# Patient Record
Sex: MALE | Race: WHITE | NOT HISPANIC OR LATINO | Employment: OTHER | URBAN - METROPOLITAN AREA
[De-identification: names, ages, dates, MRNs, and addresses within clinical notes are randomized per-mention and may not be internally consistent; named-entity substitution may affect disease eponyms.]

---

## 2023-04-05 ENCOUNTER — TELEPHONE (OUTPATIENT)
Dept: GASTROENTEROLOGY | Facility: CLINIC | Age: 83
End: 2023-04-05

## 2023-04-05 NOTE — TELEPHONE ENCOUNTER
----- Message from Antonette Jack sent at 4/5/2023 10:05 AM CDT -----  Regarding: Colonoscopy  Contact: patient  Per phone call with patient, he wanted to know the last time a colonoscopy was done.  Please return call at  [phone].    Thanks,  SJ

## 2024-02-19 ENCOUNTER — TELEPHONE (OUTPATIENT)
Dept: GASTROENTEROLOGY | Facility: CLINIC | Age: 84
End: 2024-02-19

## 2024-02-19 NOTE — TELEPHONE ENCOUNTER
Called patient and cannot lvm. Patient is not due for colonoscopy until 11/9/2024. LRA 2/19/24      ----- Message from Ignacia Lezama sent at 2/19/2024  8:43 AM CST -----  Contact: self  Pt needs to schedule colonoscopy pls call [phone] with appt details

## 2024-02-26 ENCOUNTER — TELEPHONE (OUTPATIENT)
Dept: GASTROENTEROLOGY | Facility: CLINIC | Age: 84
End: 2024-02-26
Payer: MEDICARE

## 2024-02-26 NOTE — TELEPHONE ENCOUNTER
----- Message from Ignacia Lezama sent at 2/19/2024  8:43 AM CST -----  Contact: self  Pt needs to schedule colonoscopy pls call [phone] with appt details

## 2024-02-26 NOTE — TELEPHONE ENCOUNTER
Est Rmm patient calling to [facility] colonoscopy. Patients last colonoscopy is in gmed. Patients last one was on 11/9/2021 with RMM. Patient is not due to 11/9/2024. Patient also has medicare. Patient has apt [facility] for 5/13/24 @ 9:00 with [facility] and patient is aware of date and time. 2/26/24 LRA

## 2024-03-08 ENCOUNTER — TELEPHONE (OUTPATIENT)
Dept: GASTROENTEROLOGY | Facility: CLINIC | Age: 84
End: 2024-03-08
Payer: MEDICARE

## 2024-03-08 NOTE — TELEPHONE ENCOUNTER
----- Message from Ingacia Lezama sent at 3/8/2024  8:56 AM CST -----  Contact: self  Type:  Needs Medical Advice    Who Called: Augustina Bazzi  Symptoms (please be specific): diarrhea   How long has patient had these symptoms:  1 month  Pharmacy name and phone #:  [facility]  [address], [place] [address]  [phone]  Would the patient rather a call back or a response via [facility]?   Best Call Back Number: [phone]  Additional Information: previous treatment from Dr. Rueda

## 2024-03-08 NOTE — TELEPHONE ENCOUNTER
----- Message from Ignacia Lezama sent at 3/8/2024  8:56 AM CST -----  Contact: self  Type:  Needs Medical Advice    Who Called: Augustina Bazzi  Symptoms (please be specific): diarrhea   How long has patient had these symptoms:  1 month  Pharmacy name and phone #:  [facility]  [address], [place] [address]  [phone]  Would the patient rather a call back or a response via [facility]?   Best Call Back Number: [phone]  Additional Information: previous treatment from Dr. Rueda

## 2024-03-08 NOTE — TELEPHONE ENCOUNTER
Pt was calling to check status of previous call.... pt stated he was told he  would receive a call from [employer] and havent heard from anyone regarding the diarrhea  for one month -[place]

## 2024-03-15 NOTE — TELEPHONE ENCOUNTER
Patient was scheduled 5/13/2024 for colon consult. If having any problems such as diarrhea, Ok to make sooner appt with NP.  Kn

## 2024-03-20 NOTE — TELEPHONE ENCOUNTER
Call back and asked if he is still having diarrhea issues. Pt stated yes, not as sever. I asked if he would like to move up is OV. Pt stated yes, I r/s for 4/18/24 with [facility]. charlie

## 2024-04-03 DIAGNOSIS — Z86.010 PERSONAL HISTORY OF COLONIC POLYPS: ICD-10-CM

## 2024-04-03 DIAGNOSIS — R19.7 DIARRHEA: Primary | ICD-10-CM

## 2024-04-18 ENCOUNTER — OFFICE VISIT (OUTPATIENT)
Dept: GASTROENTEROLOGY | Facility: CLINIC | Age: 84
End: 2024-04-18
Payer: MEDICARE

## 2024-04-18 ENCOUNTER — TELEPHONE (OUTPATIENT)
Dept: GASTROENTEROLOGY | Facility: CLINIC | Age: 84
End: 2024-04-18

## 2024-04-18 VITALS
BODY MASS INDEX: 26.18 KG/M2 | WEIGHT: 187 LBS | DIASTOLIC BLOOD PRESSURE: 73 MMHG | HEART RATE: 58 BPM | SYSTOLIC BLOOD PRESSURE: 164 MMHG | HEIGHT: 71 IN | OXYGEN SATURATION: 96 %

## 2024-04-18 DIAGNOSIS — D64.9 ANEMIA, UNSPECIFIED TYPE: ICD-10-CM

## 2024-04-18 DIAGNOSIS — K21.9 GASTROESOPHAGEAL REFLUX DISEASE, UNSPECIFIED WHETHER ESOPHAGITIS PRESENT: ICD-10-CM

## 2024-04-18 DIAGNOSIS — Z86.010 PERSONAL HISTORY OF COLONIC POLYPS: ICD-10-CM

## 2024-04-18 DIAGNOSIS — Z86.19 HISTORY OF HELICOBACTER PYLORI INFECTION: ICD-10-CM

## 2024-04-18 DIAGNOSIS — Z79.02 LONG TERM (CURRENT) USE OF ANTITHROMBOTICS/ANTIPLATELETS: ICD-10-CM

## 2024-04-18 DIAGNOSIS — R13.10 DYSPHAGIA, UNSPECIFIED TYPE: ICD-10-CM

## 2024-04-18 DIAGNOSIS — R19.7 DIARRHEA, UNSPECIFIED TYPE: Primary | ICD-10-CM

## 2024-04-18 DIAGNOSIS — R19.4 CHANGE IN BOWEL HABITS: ICD-10-CM

## 2024-04-18 PROCEDURE — 99205 OFFICE O/P NEW HI 60 MIN: CPT | Mod: S$GLB,,,

## 2024-04-18 RX ORDER — LOSARTAN POTASSIUM 50 MG/1
TABLET ORAL
COMMUNITY
Start: 2024-03-15

## 2024-04-18 RX ORDER — ATORVASTATIN CALCIUM 80 MG/1
TABLET, FILM COATED ORAL
COMMUNITY
Start: 2024-03-15

## 2024-04-18 RX ORDER — CLOPIDOGREL BISULFATE 75 MG/1
TABLET ORAL
COMMUNITY
Start: 2024-03-15

## 2024-04-18 RX ORDER — LORAZEPAM 1 MG/1
TABLET ORAL
COMMUNITY
Start: 2024-04-16

## 2024-04-18 RX ORDER — PANTOPRAZOLE SODIUM 40 MG/1
TABLET, DELAYED RELEASE ORAL
COMMUNITY
Start: 2024-04-04

## 2024-04-18 RX ORDER — LEVOTHYROXINE SODIUM 125 UG/1
TABLET ORAL
COMMUNITY
Start: 2024-03-15

## 2024-04-18 RX ORDER — DORZOLAMIDE HCL 20 MG/ML
SOLUTION/ DROPS OPHTHALMIC
COMMUNITY
Start: 2024-04-16

## 2024-04-18 RX ORDER — TAMSULOSIN HYDROCHLORIDE 0.4 MG/1
CAPSULE ORAL
COMMUNITY
Start: 2024-03-08

## 2024-04-18 RX ORDER — LATANOPROST 50 UG/ML
SOLUTION/ DROPS OPHTHALMIC
COMMUNITY
Start: 2024-04-16

## 2024-04-18 NOTE — LETTER
April 18, 2024        Bib Bautista, [facility]  [address]  [place] [address]             [place] [facility]  [address] [address]  Phone: [phone]  Fax: [phone]   Patient: Augustina Bazzi   MR Number: [id number]   YOB: 1940   Date of Visit: 4/18/2024     Dear Dr. Bautista,     I am writing to you for some assistance with a patient that is currently under your care. Augustina Bazzi  1940  has seen Viviana Stone NP and needs to be scheduled for a EGD/Colonoscopy with Dr. Smith. Ideally, the patient would hold the Plavix for 5 days in the event that interventions are needed (dilation, polypectomy, biopsies, etc.). Since Viviana Stone NP is not managing this medication, we would like your assistance in approving the above request. Please fax response to [phone]. Your response is greatly appreciated and will assist us in providing optimal patient care. Should you have any questions or concerns, please do not hesitate to contact me at [phone].           Sincerely,      Viviana Stone NP            CC  No Recipients    Enclosure

## 2024-04-18 NOTE — PROGRESS NOTES
Clinic Note    Reason for visit:  The primary encounter diagnosis was Diarrhea, unspecified type. Diagnoses of Anemia, unspecified type, Gastroesophageal reflux disease, unspecified whether esophagitis present, History of Helicobacter pylori infection, Long term (current) use of antithrombotics/antiplatelets, Change in bowel habits, Personal history of colonic polyps, and Dysphagia, unspecified type were also pertinent to this visit.    PCP: Jamie Pablo   [address] / [facility] [address]    HPI:  This is a 83 y.o. male who is here to establish care. Patient previously established with Dr. Sanders. He is due for a colonoscopy in 11/2024. He has had intermittent diarrhea for 6-8 months. He started taking Imodium for diarrhea and this helped. Still having daily BMs. He is only taking Imodium about once a week now. He states he has always had constipation up until 6-8 months ago. No blood in stool or black stool. Denies abd pain. He may have dysphagia with dry meat but he just avoids eating this. He had his esophagus stretched many years ago and has done well since then. He takes pantoprazole 40 mg daily and reflux well controlled. As long as he takes the pantoprazole he does well. Weight stable. No recent abd imaging.      He states about a year and a half ago he had iron infusions x 2. He states about 5-6 months ago he had blood work and saw that his iron was normal but on lower side of normal. He is currently taking iron by mouth about every other day. He was also low in Vit B12 and was doing B12 injections for a while but went to taking B12 by mouth and is still doing this. He will be having blood work with Dr. Pablo on 5/22/2024.     He had heart stent placed he believes in 5/2023. He has follow up with cardiology, Bib Bautista 5/2024. Dr. Munoz was his cardiologist.     Colonoscopy w/Dr. Sanders 11/9/2021: Mild diverticulosis of the sigmoid colon. Small IH. 3 TA, 1 hyp. Repeat colon in 3  yr    EGD 6/5/2018: Ring in the gastroesophageal junction, widely patent. GBx react gastropathy w/o Hp. GEBx reflux  4/2018 H pylori stool Ag pos s/p Pylera     Review of Systems   Constitutional:  Negative for diaphoresis, fatigue, fever and unexpected weight change.   HENT:  Negative for hearing loss, mouth sores, postnasal drip, sore throat and trouble swallowing.    Eyes:  Negative for pain, discharge and eye dryness.   Respiratory:  Negative for apnea, cough, choking, chest tightness, shortness of breath and wheezing.    Cardiovascular:  Negative for chest pain, palpitations and leg swelling.   Gastrointestinal:  Positive for change in bowel habit and diarrhea. Negative for abdominal distention, abdominal pain, anal bleeding, blood in stool, constipation, nausea, rectal pain, vomiting, reflux and fecal incontinence.   Genitourinary:  Negative for bladder incontinence, dysuria and hematuria.   Musculoskeletal:  Negative for arthralgias, back pain and joint swelling.   Integumentary:  Negative for color change and rash.   Allergic/Immunologic: Negative for environmental allergies and food allergies.   Neurological:  Negative for seizures and headaches.   Hematological:  Negative for adenopathy. Does not bruise/bleed easily.        Past Medical History:   Diagnosis Date    BPH (benign prostatic hyperplasia)     CAD (coronary artery disease)     Essential (primary) hypertension     GERD (gastroesophageal reflux disease)     History of asthma     as a child    History of skin cancer     Hyperlipidemia     Hypothyroidism, unspecified     Other seasonal allergic rhinitis     Personal history of colonic polyps     Unspecified glaucoma      Past Surgical History:   Procedure Laterality Date    CATARACT EXTRACTION Bilateral     COLONOSCOPY      CORONARY ANGIOPLASTY WITH STENT PLACEMENT  05/2023    EGD - EXTERNAL RESULT      FACIAL RECONSTRUCTION SURGERY      after a trauma    HAND TENDON SURGERY Left     HEMORRHOID  "SURGERY      HIP REPLACEMENT ARTHROPLASTY Left     PROSTATE BIOPSY       Family History   Problem Relation Name Age of Onset    Heart failure Mother      Throat cancer Father      Leukemia Sister      Leukemia Brother      Colon cancer Neg Hx      Esophageal cancer Neg Hx      Irritable bowel syndrome Neg Hx      Liver cancer Neg Hx      Liver disease Neg Hx      Rectal cancer Neg Hx      Stomach cancer Neg Hx      Ulcerative colitis Neg Hx       Social History     Tobacco Use    Smoking status: Never    Smokeless tobacco: Never   Substance Use Topics    Alcohol use: Yes     Comment: occasionally    Drug use: Never     Review of patient's allergies indicates:   Allergen Reactions    Penicillins Rash      Medication List with Changes/Refills   Current Medications    ATORVASTATIN (LIPITOR) 80 MG TABLET        CLOPIDOGREL (PLAVIX) 75 MG TABLET        DORZOLAMIDE (TRUSOPT) 2 % OPHTHALMIC SOLUTION        LATANOPROST 0.005 % OPHTHALMIC SOLUTION        LORAZEPAM (ATIVAN) 1 MG TABLET        LOSARTAN (COZAAR) 50 MG TABLET        PANTOPRAZOLE (PROTONIX) 40 MG TABLET        SYNTHROID 125 MCG TABLET        TAMSULOSIN (FLOMAX) 0.4 MG CAP             Vital Signs:  BP (!) 164/73 (BP Location: Left arm, Patient Position: Sitting)   Pulse (!) 58   Ht 5' 11" (1.803 m)   Wt 84.8 kg (187 lb)   SpO2 96%   BMI 26.08 kg/m²        Physical Exam  Constitutional:       General: He is not in acute distress.     Appearance: Normal appearance. He is well-developed. He is not ill-appearing or toxic-appearing.   HENT:      Head: Normocephalic and atraumatic.      Nose: Nose normal.      Mouth/Throat:      Mouth: Mucous membranes are moist.      Pharynx: Oropharynx is clear. No oropharyngeal exudate or posterior oropharyngeal erythema.   Eyes:      General: Lids are normal. No scleral icterus.        Right eye: No discharge.         Left eye: No discharge.      Conjunctiva/sclera: Conjunctivae normal.   Cardiovascular:      Rate and Rhythm: " Normal rate and regular rhythm.      Pulses:           Radial pulses are 2+ on the right side and 2+ on the left side.   Pulmonary:      Effort: Pulmonary effort is normal. No respiratory distress.      Breath sounds: No stridor. No wheezing.   Abdominal:      General: Bowel sounds are normal. There is no distension.      Palpations: Abdomen is soft. There is no fluid wave, hepatomegaly, splenomegaly or mass.      Tenderness: There is no abdominal tenderness. There is no guarding or rebound.   Musculoskeletal:      Cervical back: Full passive range of motion without pain.   Lymphadenopathy:      Cervical: No cervical adenopathy.   Skin:     General: Skin is warm and dry.      Capillary Refill: Capillary refill takes less than 2 seconds.      Coloration: Skin is not cyanotic, jaundiced or pale.   Neurological:      General: No focal deficit present.      Mental Status: He is alert and oriented to person, place, and time.   Psychiatric:         Mood and Affect: Mood normal.         Behavior: Behavior is cooperative.            All of the data above and below has been reviewed by myself and any further interpretations will be reflected in the assessment and plan.   The data includes review of external notes, and independent interpretation of lab results, procedures, x-rays, and imaging reports.      Assessment:  Diarrhea, unspecified type  -     Ambulatory referral/consult to Gastroenterology  -     Ambulatory Referral to External Surgery  -     Calprotectin, Stool; Future; Expected date: 04/18/2024  -     Clostridium diff Toxin/GDH w/ reflex PCR; Future; Expected date: 04/18/2024  -     Fecal fat, qualitative; Future; Expected date: 04/18/2024  -     Giardia / Cryptosporidum, EIA; Future; Expected date: 04/18/2024  -     Pancreatic elastase, fecal; Future; Expected date: 04/18/2024    Anemia, unspecified type    Gastroesophageal reflux disease, unspecified whether esophagitis present    History of Helicobacter pylori  infection    Long term (current) use of antithrombotics/antiplatelets    Change in bowel habits    Personal history of colonic polyps  -     Ambulatory referral/consult to Gastroenterology  -     Ambulatory Referral to External Surgery    Dysphagia, unspecified type  -     Ambulatory Referral to External Surgery    Colon due 11/9/2024. Recommended sooner colonoscopy given his change in bowel habits but he would like to wait until the date he is due.   Getting labs with Dr. Pablo 5/22/2024. We will request his prior lab results from 2023-current to look at iron levels. Has had IV iron in the past. Currently taking oral iron every other day. Believes this has also helped his loose stools.   GERD, chronic, controlled on panto 40 daily. Failed stopping. Dysphagia with dry meats. Has h/o esophageal dilation in the past. 2018 EGD w/widely patent LE ring.   Diarrhea improved but will get stool tests to rule out infection/EPI. Plan for EGDCBx as well.  Will get clearance from Bib Bautista to stop Plavix 5 days prior to procedure. Will send prep closer to date of procedure.      Recommendations:  Schedule upper and lower endoscopies with Dr. Smith. Stop Plavix 5 days prior to procedure.   Complete stool tests.     Risks, benefits, and alternatives of medical management, any associated procedures, and/or treatment discussed with the patient. Patient given opportunity to ask questions and voices understanding. Patient has elected to proceed with the recommended care modalities as discussed.    Follow up with Dr. Smith.     Order summary:  Orders Placed This Encounter   Procedures    Clostridium diff Toxin/GDH w/ reflex PCR    Calprotectin, Stool    Fecal fat, qualitative    Giardia / Cryptosporidum, EIA    Pancreatic elastase, fecal    Ambulatory Referral to External Surgery        Instructed patient to notify my office if they have not been contacted within two weeks after any procedures, submitting any samples  (biopsies, blood, stool, urine, etc.) or after any imaging (X-ray, CT, MRI, etc.).      Viviana Stone NP    This document may have been created using a voice recognition transcribing system. Incorrect words or phrases may have been missed during proofreading. Please interpret accordingly or contact me for clarification.

## 2024-04-18 NOTE — PATIENT INSTRUCTIONS
Schedule upper and lower endoscopies with Dr. Smith. Stop Plavix 5 days prior to procedure.   Complete stool tests.      [facility]  [address] [address], [address]  [place] [address]  p. [phone]  f. [phone]  M-F: 6:00 a.m. to  3 p.m.  Closed for lunch 12:00 p.m. to 1 p.m.    Please notify my office if you have not been contacted within two weeks after any procedures, submitting any samples (biopsies, blood, stool, urine, etc.) or after any imaging (X-ray, CT, MRI, etc.).

## 2024-04-18 NOTE — TELEPHONE ENCOUNTER
Patient was given instructions and they were reviewed with patient. Patient was given stool orders. Patient was given AVS with apt details. Morteza was faxed to request labs from Dr. Pablo. 4/18/24 LRA

## 2024-04-23 ENCOUNTER — TELEPHONE (OUTPATIENT)
Dept: GASTROENTEROLOGY | Facility: CLINIC | Age: 84
End: 2024-04-23
Payer: MEDICARE

## 2024-04-23 NOTE — TELEPHONE ENCOUNTER
4/19/24 Received cardio clearance from Bib Bautista via fax. Also have pet exam on 4/14/24 and office visit notes from 11/15/23. Patient may hold plavix 7 days prior to procedure. Scanning doc into media manger. 4/23/24 LRA

## 2024-04-24 LAB
ADDITIONAL TESTING: NORMAL
APPEARANCE SNV: NORMAL
C DIFF TOXIN: NEGATIVE
CRYPTOSPORIDIUM DNA: NOT DETECTED
GIARDIA LAMBLIA DNA: NOT DETECTED

## 2024-04-30 NOTE — PROGRESS NOTES
11/22/2023: B12 wnl, Hgb 10.6L, MCV 99.1, CBC onl.  10/2023:  HFP wnl, Cr 1.41H, BMP onl, iron/TIBC/%sat/lipid/B12/ferr wnl, Hgb 10.8L, .6H, CBC onl  MLC

## 2024-05-03 NOTE — TELEPHONE ENCOUNTER
Notify patient his stool test were overall normal. No C diff or parasitic infection. His pancreas seems to be functioning well and inflammatory marker was normal. At his OV he wanted to wait until he was due for colonoscopy to have upper endoscopy which is scheduled 11/15/2024. He may notify us of any issues sooner.    Scan in stool test results (calpro/elastase/fat) from [facility] into Epic.    4/24/2024: C diff/Giardia/Crypto neg. Calpro 71B, fat/elastase nl  11/22/2023: B12 wnl, Hgb 10.6L, MCV 99.1, CBC onl.  10/2023:  HFP wnl, Cr 1.41H, BMP onl, iron/TIBC/%sat/lipid/B12/ferr wnl, Hgb 10.8L, .6H, CBC onl  4/17/2023: HFP wnl, Cr 1.42, BMP onl, Hgb 11.2L, MCV 102H, CBC onl  MLC

## 2024-05-03 NOTE — TELEPHONE ENCOUNTER
Results discussed with patient per providers chart remarks. Patient voices understanding/agreement. Results sent to PCP. Pt states his sx are improving as well.  -Jenni HERNANDES CMA

## 2024-10-11 ENCOUNTER — TELEPHONE (OUTPATIENT)
Dept: GASTROENTEROLOGY | Facility: CLINIC | Age: 84
End: 2024-10-11
Payer: MEDICARE

## 2024-10-11 DIAGNOSIS — R19.7 DIARRHEA, UNSPECIFIED TYPE: Primary | ICD-10-CM

## 2024-10-11 RX ORDER — POLYETHYLENE GLYCOL 3350, SODIUM SULFATE ANHYDROUS, SODIUM BICARBONATE, SODIUM CHLORIDE, POTASSIUM CHLORIDE 236; 22.74; 6.74; 5.86; 2.97 G/4L; G/4L; G/4L; G/4L; G/4L
4 POWDER, FOR SOLUTION ORAL ONCE
Qty: 4000 ML | Refills: 0 | Status: SHIPPED | OUTPATIENT
Start: 2024-10-11 | End: 2024-10-11

## 2024-10-11 NOTE — TELEPHONE ENCOUNTER
Returned pt call. I told him that I have emailed him new prep instruction to the email address on file and that I would send a message to the NP's to sent prep meds to pharmacy. Pt acknowledge he understood. charlie

## 2024-10-11 NOTE — TELEPHONE ENCOUNTER
----- Message from Starla sent at 10/9/2024 10:25 AM CDT -----  Contact: self  Patient is requesting a call back regarding instructions and medication for colonoscopy. Please call back at [phone]

## 2024-11-04 ENCOUNTER — TELEPHONE (OUTPATIENT)
Dept: GASTROENTEROLOGY | Facility: CLINIC | Age: 84
End: 2024-11-04
Payer: MEDICARE

## 2024-11-04 NOTE — TELEPHONE ENCOUNTER
----- Message from Starla sent at 11/4/2024  8:30 AM CST -----  Contact: self  Patient is requesting a call back regarding up coming procedure - asking if he can get a different medication like liquid instead of the pills . Please call back at [phone]

## 2024-11-04 NOTE — TELEPHONE ENCOUNTER
Returned pt call. Pt wanted Sutab vs the liquid for his prep meds. I told him to call his pharmacy and till them that he would like to substitute the liquid for the tables. I also told him that he would need a coupon for the tablets and he could come by the clinic to pick it up. Pt acknowledge he understood. charlie

## 2024-11-05 DIAGNOSIS — R19.7 DIARRHEA, UNSPECIFIED TYPE: ICD-10-CM

## 2024-11-07 DIAGNOSIS — R19.7 DIARRHEA, UNSPECIFIED TYPE: ICD-10-CM

## 2024-11-07 DIAGNOSIS — Z86.0100 HISTORY OF COLON POLYPS: Primary | ICD-10-CM

## 2024-11-07 DIAGNOSIS — R13.10 DYSPHAGIA, UNSPECIFIED TYPE: ICD-10-CM

## 2024-11-07 RX ORDER — POLYETHYLENE GLYCOL 3350, SODIUM SULFATE ANHYDROUS, SODIUM BICARBONATE, SODIUM CHLORIDE, POTASSIUM CHLORIDE 236; 22.74; 6.74; 5.86; 2.97 G/4L; G/4L; G/4L; G/4L; G/4L
4 POWDER, FOR SOLUTION ORAL ONCE
Qty: 4000 ML | Refills: 0 | Status: SHIPPED | OUTPATIENT
Start: 2024-11-07 | End: 2024-11-07

## 2024-11-07 RX ORDER — POLYETHYLENE GLYCOL-3350 AND ELECTROLYTES 236; 6.74; 5.86; 2.97; 22.74 G/274.31G; G/274.31G; G/274.31G; G/274.31G; G/274.31G
POWDER, FOR SOLUTION ORAL
Refills: 0 | OUTPATIENT
Start: 2024-11-07

## 2024-11-07 RX ORDER — SOD SULF/POT CHLORIDE/MAG SULF 1.479 G
TABLET ORAL
Qty: 24 TABLET | Refills: 0 | Status: SHIPPED | OUTPATIENT
Start: 2024-11-07 | End: 2024-11-07 | Stop reason: SDUPTHER

## 2024-11-07 RX ORDER — SOD SULF/POT CHLORIDE/MAG SULF 1.479 G
TABLET ORAL
Qty: 24 TABLET | Refills: 0 | Status: SHIPPED | OUTPATIENT
Start: 2024-11-07

## 2024-11-07 NOTE — TELEPHONE ENCOUNTER
Sutab sent to [facility]. Also confirm with the patient that he has not had any new cardiac issues such as stents placed or stroke etc since we received clearance in 4/2024. Should be holding Plavix x 5 days.  MLC

## 2024-11-07 NOTE — TELEPHONE ENCOUNTER
----- Message from Starla sent at 11/7/2024  8:38 AM CST -----  Contact: self  Patient is requesting a call back regarding colonoscopy medication - asking when it will be called out (at pharmacy now). Please call back at [phone]

## 2024-11-07 NOTE — TELEPHONE ENCOUNTER
S/W pt & informed him his prep was sent to his pharmacy. Also confirmed with the pt that he has not had any cardiac issues since we received clearance on 4/4024. Pt stated he is holding on the Plavix.-MALGORZATAP

## 2024-11-07 NOTE — TELEPHONE ENCOUNTER
S/W pt & informed him that a refill for his prep meds was requested on 11/05/24 & is currently pending. Informed pt that I would send a message to the providers regarding prep. -JESSIKA

## 2024-11-08 VITALS — WEIGHT: 187 LBS | BODY MASS INDEX: 26.18 KG/M2 | HEIGHT: 71 IN

## 2024-11-08 NOTE — TELEPHONE ENCOUNTER
"[place] [facility]  [address] [address]  Phone: [phone]  Fax: [phone]    History & Physical         Provider: Dr. Lydia Smith    Patient Name: Augustina LOUIS (age):1940  84 y.o.           Gender: male   Phone: [phone]     Referring Physician: Jamie Pablo     Vital Signs:   Height - 5' 11"  Weight - 187 lb  BMI -  26.08    Plan: EGD w/ EGDCBx/Colonoscopy @ [facility]    Encounter Diagnoses   Name Primary?    History of colon polyps Yes    Diarrhea, unspecified type     Dysphagia, unspecified type            History:      Past Medical History:   Diagnosis Date    BPH (benign prostatic hyperplasia)     CAD (coronary artery disease)     Essential (primary) hypertension     GERD (gastroesophageal reflux disease)     History of asthma     as a child    History of skin cancer     Hyperlipidemia     Hypothyroidism, unspecified     Other seasonal allergic rhinitis     Personal history of colonic polyps     Unspecified glaucoma       Past Surgical History:   Procedure Laterality Date    CATARACT EXTRACTION Bilateral     COLONOSCOPY      CORONARY ANGIOPLASTY WITH STENT PLACEMENT  2023    EGD - EXTERNAL RESULT      FACIAL RECONSTRUCTION SURGERY      after a trauma    HAND TENDON SURGERY Left     HEMORRHOID SURGERY      HIP REPLACEMENT ARTHROPLASTY Left     PROSTATE BIOPSY        Medication List with Changes/Refills   Current Medications    ATORVASTATIN (LIPITOR) 80 MG TABLET        CLOPIDOGREL (PLAVIX) 75 MG TABLET        DORZOLAMIDE (TRUSOPT) 2 % OPHTHALMIC SOLUTION        LATANOPROST 0.005 % OPHTHALMIC SOLUTION        LORAZEPAM (ATIVAN) 1 MG TABLET        LOSARTAN (COZAAR) 50 MG TABLET        PANTOPRAZOLE (PROTONIX) 40 MG TABLET        SYNTHROID 125 MCG TABLET        TAMSULOSIN (FLOMAX) 0.4 MG CAP       Changed and/or Refilled Medications    Modified Medication Previous Medication    " SOD SULF-POT CHLORIDE-MAG SULF (SUTAB) 1.479-0.188- 0.225 GRAM TABLET sod sulf-pot chloride-mag sulf (SUTAB) 1.479-0.188- 0.225 gram tablet       Take according to package instructions with indicated amount of water. No breakfast day before test. May substitute with Suprep, Clenpiq, Plenvu, Moviprep or GoLytely based on Rx plan and patient preference.    Take according to package instructions with indicated amount of water. No breakfast day before test. May substitute with Suprep, Clenpiq, Plenvu, Moviprep or GoLytely based on Rx plan and patient preference.      Review of patient's allergies indicates:   Allergen Reactions    Penicillins Rash      Family History   Problem Relation Name Age of Onset    Heart failure Mother      Throat cancer Father      Leukemia Sister      Leukemia Brother      Colon cancer Neg Hx      Esophageal cancer Neg Hx      Irritable bowel syndrome Neg Hx      Liver cancer Neg Hx      Liver disease Neg Hx      Rectal cancer Neg Hx      Stomach cancer Neg Hx      Ulcerative colitis Neg Hx        Social History     Tobacco Use    Smoking status: Never    Smokeless tobacco: Never   Substance Use Topics    Alcohol use: Yes     Comment: occasionally    Drug use: Never        Physical Examination:     General Appearance:___________________________  HEENT: _____________________________________  Abdomen:____________________________________  Heart:________________________________________  Lungs:_______________________________________  Extremities:___________________________________  Skin:_________________________________________  Endocrine:____________________________________  Genitourinary:_________________________________  Neurological:__________________________________      Patient has been evaluated immediately prior to sedation and is medically cleared for endoscopy with IVCS as an ASA class: ______      Physician Signature: _________________________       Date: ________  Time:  ________

## 2024-11-15 ENCOUNTER — OUTSIDE PLACE OF SERVICE (OUTPATIENT)
Dept: GASTROENTEROLOGY | Facility: CLINIC | Age: 84
End: 2024-11-15

## 2024-11-15 LAB — CRC RECOMMENDATION EXT: NORMAL

## 2024-11-24 ENCOUNTER — TELEPHONE (OUTPATIENT)
Dept: GASTROENTEROLOGY | Facility: CLINIC | Age: 84
End: 2024-11-24
Payer: MEDICARE

## 2024-11-25 NOTE — TELEPHONE ENCOUNTER
S/W patient & informed him he had no signs of infection, precancerous cells or Celiac disease on the upper endoscopy biopsies. No colitis on his CBx. His colon polyps were benign. Repeat colonoscopy evaluation in 3 years 11/15/27. Recall tab is up to date.  -JESSIKA

## 2024-12-06 ENCOUNTER — DOCUMENTATION ONLY (OUTPATIENT)
Dept: GASTROENTEROLOGY | Facility: CLINIC | Age: 84
End: 2024-12-06
Payer: MEDICARE